# Patient Record
Sex: MALE | Race: WHITE | NOT HISPANIC OR LATINO | Employment: STUDENT | URBAN - METROPOLITAN AREA
[De-identification: names, ages, dates, MRNs, and addresses within clinical notes are randomized per-mention and may not be internally consistent; named-entity substitution may affect disease eponyms.]

---
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
## 2024-04-24 NOTE — TELEPHONE ENCOUNTER
IMM letter provided to patient.  Patient offered four hours to make informed decision regarding appeal process; patient agreeable to discharge.     Electronically signed by Araceli Campos RN on 4/24/2024 at 3:49 PM       ok

## 2024-11-06 ENCOUNTER — CLINICAL SUPPORT (OUTPATIENT)
Dept: FAMILY MEDICINE CLINIC | Facility: CLINIC | Age: 19
End: 2024-11-06

## 2024-11-06 DIAGNOSIS — Z02.1 PRE-EMPLOYMENT DRUG SCREENING: Primary | ICD-10-CM

## 2024-11-06 PROCEDURE — 80306 DRUG TEST PRSMV INSTRMNT: CPT | Performed by: FAMILY MEDICINE

## 2025-01-13 ENCOUNTER — TELEPHONE (OUTPATIENT)
Dept: FAMILY MEDICINE CLINIC | Facility: CLINIC | Age: 20
End: 2025-01-13
Payer: COMMERCIAL

## 2025-01-13 DIAGNOSIS — G43.911 INTRACTABLE MIGRAINE WITH STATUS MIGRAINOSUS, UNSPECIFIED MIGRAINE TYPE: ICD-10-CM

## 2025-01-13 RX ORDER — SUMATRIPTAN SUCCINATE 100 MG/1
50-100 TABLET ORAL ONCE AS NEEDED
Qty: 12 TABLET | Refills: 11 | Status: SHIPPED | OUTPATIENT
Start: 2025-01-13

## 2025-01-13 NOTE — TELEPHONE ENCOUNTER
Okay to refill that with 11 refills, okay to continue his current dose if still effective or he could increase to 100mg tabkets as has grown, thanjs

## 2025-01-13 NOTE — TELEPHONE ENCOUNTER
"Patient is at home today due to a migraine. Patient was last seen 8/11/2023, and is asking if his Imitrex can be refilled before his next appointment.     He currently has Imitrex 50mg with sig of \"Take 0.5 to 1 tablet every 1 hr as needed for migraine. Max 2 per day\"  "

## 2025-01-31 ENCOUNTER — OFFICE VISIT (OUTPATIENT)
Dept: FAMILY MEDICINE CLINIC | Facility: CLINIC | Age: 20
End: 2025-01-31
Payer: COMMERCIAL

## 2025-01-31 VITALS
DIASTOLIC BLOOD PRESSURE: 76 MMHG | TEMPERATURE: 97.7 F | SYSTOLIC BLOOD PRESSURE: 124 MMHG | OXYGEN SATURATION: 98 % | WEIGHT: 291 LBS | RESPIRATION RATE: 18 BRPM | HEIGHT: 75 IN | BODY MASS INDEX: 36.18 KG/M2 | HEART RATE: 113 BPM

## 2025-01-31 DIAGNOSIS — S09.93XA FACIAL INJURY, INITIAL ENCOUNTER: Primary | ICD-10-CM

## 2025-01-31 DIAGNOSIS — R04.0 BLEEDING NOSE: ICD-10-CM

## 2025-01-31 DIAGNOSIS — E66.09 CLASS 2 OBESITY DUE TO EXCESS CALORIES WITHOUT SERIOUS COMORBIDITY WITH BODY MASS INDEX (BMI) OF 36.0 TO 36.9 IN ADULT: ICD-10-CM

## 2025-01-31 DIAGNOSIS — E66.812 CLASS 2 OBESITY DUE TO EXCESS CALORIES WITHOUT SERIOUS COMORBIDITY WITH BODY MASS INDEX (BMI) OF 36.0 TO 36.9 IN ADULT: ICD-10-CM

## 2025-01-31 PROCEDURE — 99213 OFFICE O/P EST LOW 20 MIN: CPT | Performed by: FAMILY MEDICINE

## 2025-01-31 NOTE — PROGRESS NOTES
Subjective   Franco Suarez is a 19 y.o. male.     Chief Complaint   Patient presents with    Facial Injury     Hit in the nose while playing basketball on 1/30/2025, with nose bleeds       History of Present Illness  Influenza immunization was not given due to patient refusal    Facial Injury   The incident occurred 12 to 24 hours ago. Injury mechanism: PLAYING BASKETBALL, AND GOT HIT DIRECTLY IN THE NOSE. The quality of the pain is described as stabbing. The pain is moderate. The pain has been intermittent since the injury. Pertinent negatives include no blurred vision, headaches or tinnitus. Associated symptoms comments: NOSE BLEEDS, NOSE SWELLING. He has tried nothing for the symptoms.            I personally reviewed and updated the patient's allergies, medications, problem list, and past medical, surgical, social, and family history. I have reviewed and confirmed the accuracy of the History of Present Illness and Review of Symptoms as documented by the MA/LPN/RN. Aroldo Kumari MD    Family History   Problem Relation Age of Onset    Drug abuse Father     Thyroid disease Maternal Aunt     Cancer Maternal Grandmother         skin cancer    Hyperlipidemia Maternal Grandfather     Hypertension Maternal Grandfather     Heart disease Maternal Grandfather        Social History     Tobacco Use    Smoking status: Never     Passive exposure: Never    Smokeless tobacco: Never   Vaping Use    Vaping status: Never Used   Substance Use Topics    Alcohol use: No    Drug use: No       Past Surgical History:   Procedure Laterality Date    KNEE SURGERY Left     guided growth surgery    KNEE SURGERY Left 03/12/2021       Patient Active Problem List   Diagnosis    Genu valgum    Syrinx    Encounter for WCC (well child check) with abnormal findings    Accommodative insufficiency    Hypermetropia, bilateral    Presence of retained hardware    Tendinitis of right rotator cuff    Asthma    Acute seasonal allergic rhinitis     Hypermetropia    Myopia    Pruritus    Class 2 severe obesity due to excess calories with serious comorbidity and body mass index (BMI) of 36.0 to 36.9 in adult    Right foot pain    Patellar tendinitis of right knee    Acute pain of left knee         Current Outpatient Medications:     ibuprofen (ADVIL,MOTRIN) 800 MG tablet, Take 1 tablet by mouth Every 8 (Eight) Hours As Needed for Moderate Pain (knee pain)., Disp: 90 tablet, Rfl: 1    SUMAtriptan (IMITREX) 100 MG tablet, Take 0.5-1 tablets by mouth 1 (One) Time As Needed for Migraine (May repeat in two hours (Maximum two per day).)., Disp: 12 tablet, Rfl: 11    albuterol sulfate HFA (ProAir HFA) 108 (90 Base) MCG/ACT inhaler, Inhale 2 puffs Every 6 (Six) Hours As Needed for Wheezing or Shortness of Air. (Patient not taking: Reported on 1/31/2025), Disp: 8.5 g, Rfl: 2    Fluticasone Furoate (Arnuity Ellipta) 100 MCG/ACT aerosol powder , Inhale 1 puff Daily. (Patient not taking: Reported on 1/31/2025), Disp: 30 each, Rfl: 2    montelukast (SINGULAIR) 10 MG tablet, Take 1 tablet by mouth Every Night. (Patient not taking: Reported on 1/31/2025), Disp: 30 tablet, Rfl: 12    ondansetron ODT (ZOFRAN-ODT) 4 MG disintegrating tablet, Place 1 tablet on the tongue Every 8 (Eight) Hours As Needed for Nausea or Vomiting. (Patient not taking: Reported on 1/31/2025), Disp: 42 tablet, Rfl: 0         Review of Systems   Constitutional:  Negative for chills and diaphoresis.   HENT:  Negative for tinnitus, trouble swallowing and voice change.    Eyes:  Negative for blurred vision and visual disturbance.   Respiratory:  Negative for shortness of breath.    Cardiovascular:  Negative for chest pain and palpitations.   Gastrointestinal:  Negative for abdominal pain and nausea.   Endocrine: Negative for polydipsia and polyphagia.   Genitourinary:  Negative for hematuria.   Musculoskeletal:  Negative for neck stiffness.   Skin:  Negative for color change and pallor.  "  Allergic/Immunologic: Negative for immunocompromised state.   Neurological:  Negative for seizures and syncope.   Hematological:  Negative for adenopathy.   Psychiatric/Behavioral:  Negative for sleep disturbance and suicidal ideas.        I have reviewed and confirmed the accuracy of the ROS as documented by the MA/LPN/RN Aroldo Kumari MD      Objective   /76 (BP Location: Right arm, Patient Position: Sitting, Cuff Size: Adult)   Pulse 113   Temp 97.7 °F (36.5 °C) (Temporal)   Resp 18   Ht 189.9 cm (74.76\")   Wt 132 kg (291 lb)   SpO2 98%   BMI 36.60 kg/m²   BP Readings from Last 3 Encounters:   01/31/25 124/76   08/11/23 142/88   03/21/23 136/90     Wt Readings from Last 3 Encounters:   01/31/25 132 kg (291 lb)   08/11/23 126 kg (278 lb 3.2 oz) (>99%, Z= 2.81)*   03/21/23 127 kg (280 lb 6.4 oz) (>99%, Z= 2.83)*     * Growth percentiles are based on CDC (Boys, 2-20 Years) data.     Physical Exam  Constitutional:       Appearance: Normal appearance. He is well-developed. He is not diaphoretic.   HENT:      Head: Normocephalic and atraumatic.      Right Ear: Hearing, tympanic membrane, ear canal and external ear normal.      Left Ear: Hearing, tympanic membrane, ear canal and external ear normal.      Nose: Nose normal. No mucosal edema or congestion.      Right Sinus: No maxillary sinus tenderness or frontal sinus tenderness.      Left Sinus: No maxillary sinus tenderness or frontal sinus tenderness.      Comments: Bruising, mild asymmetry nasal bridge, no bleeding, no sign of septal hematoma.     Mouth/Throat:      Mouth: Mucous membranes are moist. No oral lesions.      Pharynx: Uvula midline. No oropharyngeal exudate or posterior oropharyngeal erythema.      Tonsils: No tonsillar exudate.   Eyes:      General: Lids are normal.      Extraocular Movements: Extraocular movements intact.      Conjunctiva/sclera: Conjunctivae normal.      Pupils: Pupils are equal, round, and reactive to light.   Neck: " "     Thyroid: No thyromegaly.      Vascular: No carotid bruit or JVD.      Trachea: No tracheal deviation.   Cardiovascular:      Rate and Rhythm: Normal rate and regular rhythm.      Heart sounds: Normal heart sounds. No murmur heard.     No friction rub. No gallop.   Pulmonary:      Effort: Pulmonary effort is normal.      Breath sounds: Normal breath sounds. No stridor. No decreased breath sounds, wheezing or rales.   Abdominal:      General: Bowel sounds are normal. There is no distension.      Palpations: Abdomen is soft. There is no mass.      Tenderness: There is no abdominal tenderness. There is no guarding or rebound.      Hernia: No hernia is present.   Lymphadenopathy:      Head:      Right side of head: No submental, submandibular, tonsillar, preauricular, posterior auricular or occipital adenopathy.      Left side of head: No submental, submandibular, tonsillar, preauricular, posterior auricular or occipital adenopathy.      Cervical: No cervical adenopathy.   Skin:     General: Skin is warm and dry.      Coloration: Skin is not pale.   Neurological:      Mental Status: He is alert and oriented to person, place, and time.      Cranial Nerves: No cranial nerve deficit.      Sensory: No sensory deficit.      Coordination: Coordination normal.      Gait: Gait normal.      Deep Tendon Reflexes: Reflexes are normal and symmetric.         Data / Lab Results:    No results found for: \"HGBA1C\"  Lab Results   Component Value Date    Glucose 95 01/14/2020    Glucose, UA 79 01/10/2019     No results found for: \"LDL\", \"LDLDIRECT\"  No results found for: \"CHOL\"  No results found for: \"TRIG\"  No results found for: \"HDL\"  No results found for: \"PSA\"  Lab Results   Component Value Date    WBC 12.6 (H) 03/21/2023    HGB 17.7 03/21/2023    HCT 50.9 03/21/2023    MCV 84 03/21/2023     03/21/2023     Lab Results   Component Value Date    TSH 3.71 01/10/2019      Lab Results   Component Value Date    GLUCOSE 95 " "01/14/2020    BUN 9 01/14/2020    CREATININE 0.76 01/14/2020    EGFRIFNONA CANCELED 01/14/2020    EGFRIFAFRI CANCELED 01/14/2020    BCR 12 01/14/2020    K 4.5 01/14/2020    CO2 23 01/14/2020    CALCIUM 9.4 01/14/2020    PROTENTOTREF 6.5 01/14/2020    ALBUMIN 4.2 01/14/2020    LABIL2 1.8 01/14/2020    AST 22 01/14/2020    ALT 25 01/14/2020     No results found for: \"WILTON\", \"RF\", \"SEDRATE\"   No results found for: \"CRP\"   No results found for: \"IRON\", \"TIBC\", \"FERRITIN\"   No results found for: \"QFIZCJBV27\"       Assessment & Plan      Medications        Problem List         LOS  Flu A.  Start Tamiflu/Keflex.  Increase fluid intake.  Signs symptoms dehydration discussed, call return if worsening symptoms.  Health maintanance.  Doing well, vaccines updated.  Anticipatory guidance discussed.  Cleared for sports.  Acute bacterial sinusitis.  Start antibiotics.  Increase fluid intake call return if new or worsening symptoms  Allergic rhinitis.  Continue antihistamine, nasal steroid.  Has done well on montelukast, restart.  Genu valgum.  Left knee.  Improved status post plate placement, has had a recent removal, recovering well today, incision clean and dry, benign exam.  Followed by orthopedics Dr. Galaviz.  Costochondritis.  Benign exam today.  Ice, NSAIDs, rehabilitation exercise discussed.  Call return if persistent symptoms.  Shoulder strain/rotator cuff tendinitis.    Improved/rrsolved today. Ice/nsaids/rehabilitation exercise discussed.  Consider joint injection, imaging if persistent symptoms.  Migraine headache.  Overall improved/infrequent episodes currently.  Flare currently IM Toradol given.  Increase Imitrex to 50 mg.  Call return if persistent symptoms.  Wart wrist.  Frozen today.  OTC Compound W.  Consider repeat freezing if persistent symptoms.  Foot pain.  Right distal metatarsal.  Traumatic, DDx includes tendinitis, no sign of fracture currently..  DDx includes gout.  Start as needed ibuprofen/prednisone.  " Discussed discussed active lifestyle modification.   Patella tendinitis.  Right knee by 3 to 4 months.  Start prednisone.  Ice, rehabilitation/discussed.  Check x-rays.  Call return with persistent symptoms.  Left knee pain.  Traumatic, fell, landed on knee.  With bursitis start prednisone.  Check x-rays.  History of genu valgus surgery, consider orthopedics follow-up if persistent symptoms.  Nose injury.  Possible fracture, with mild displacement.  Having some bleeding but hemistatic currentky  No sign of septal hematoma.  Discussed imaging, HEENT eval he is not worried about repair aestheticalky.  Rest/off work.  Follow-up recheck.  Consider imaging, HEENT referral if worsening symptoms.  Concussion.  Overall benign exam today, advancing activity.  History of prior head injury, no history of loss of consciousness.  Signs and symptoms of subdural hematoma discussed.  Call return if worsening symptoms.      Diagnoses and all orders for this visit:    1. Facial injury, initial encounter (Primary)    2. Bleeding nose    3. Class 2 obesity due to excess calories without serious comorbidity with body mass index (BMI) of 36.0 to 36.9 in adult      Class 2 Severe Obesity (BMI >=35 and <=39.9). Obesity-related health conditions include the following: none. Obesity is worsening. BMI is is above average; BMI management plan is completed. We discussed portion control and increasing exercise.          Expected course, medications, and adverse effects discussed.  Call or return if worsening or persistent symptoms.  I wore protective equipment throughout this patient encounter including a mask, gloves, and eye protection.  Hand hygiene was performed before donning protective equipment and after removal when leaving the room. The complete contents of the Assessment and Plan and Data/Lab Results as documented above have been reviewed and addressed by myself with the patient today as part of an ongoing evaluation / treatment plan.   If some of the documentation has been copied from a previous note and is unchanged it indicates that this problem / plan has been assessed today but is stable from a previous visit and no changes have been recommended.

## 2025-01-31 NOTE — LETTER
January 31, 2025     Patient: Franco Suarez   YOB: 2005   Date of Visit: 1/31/2025       To Whom It May Concern:    It is my medical opinion that Franco Suarez may return to work on 1/31/2025 .           Sincerely,        Aroldo Kumari MD    CC: No Recipients

## 2025-01-31 NOTE — LETTER
January 31, 2025     Patient: Franco Suarez   YOB: 2005   Date of Visit: 1/31/2025       To Whom It May Concern:    It is my medical opinion that Franco Suarez may return to work on 2/3/2025 .           Sincerely,            Aroldo Kumari MD

## 2025-05-16 ENCOUNTER — OFFICE VISIT (OUTPATIENT)
Dept: FAMILY MEDICINE CLINIC | Facility: CLINIC | Age: 20
End: 2025-05-16
Payer: OTHER MISCELLANEOUS

## 2025-05-16 VITALS
OXYGEN SATURATION: 100 % | HEIGHT: 75 IN | RESPIRATION RATE: 16 BRPM | HEART RATE: 86 BPM | SYSTOLIC BLOOD PRESSURE: 126 MMHG | BODY MASS INDEX: 35.96 KG/M2 | TEMPERATURE: 98 F | DIASTOLIC BLOOD PRESSURE: 72 MMHG | WEIGHT: 289.2 LBS

## 2025-05-16 DIAGNOSIS — S69.91XA HAND INJURY, RIGHT, INITIAL ENCOUNTER: Primary | ICD-10-CM

## 2025-05-16 DIAGNOSIS — E66.01 CLASS 2 SEVERE OBESITY DUE TO EXCESS CALORIES WITH SERIOUS COMORBIDITY AND BODY MASS INDEX (BMI) OF 36.0 TO 36.9 IN ADULT: ICD-10-CM

## 2025-05-16 DIAGNOSIS — E66.812 CLASS 2 SEVERE OBESITY DUE TO EXCESS CALORIES WITH SERIOUS COMORBIDITY AND BODY MASS INDEX (BMI) OF 36.0 TO 36.9 IN ADULT: ICD-10-CM

## 2025-05-16 PROCEDURE — 99213 OFFICE O/P EST LOW 20 MIN: CPT | Performed by: FAMILY MEDICINE

## 2025-05-16 RX ORDER — CEPHALEXIN 500 MG/1
500 CAPSULE ORAL 3 TIMES DAILY
Qty: 30 CAPSULE | Refills: 0 | Status: SHIPPED | OUTPATIENT
Start: 2025-05-16

## 2025-05-16 RX ORDER — TRIAMCINOLONE ACETONIDE 5 MG/G
1 CREAM TOPICAL 2 TIMES DAILY PRN
Qty: 60 G | Refills: 2 | Status: SHIPPED | OUTPATIENT
Start: 2025-05-16

## 2025-05-16 NOTE — PROGRESS NOTES
Subjective   Franco Suarez is a 20 y.o. male.     Chief Complaint   Patient presents with    Hand Injury     Right    Worker's Compensation       History of Present Illness  Worker Comp:  Date of Injury: 5/15/2025  Type of injury: Right Hand directly sprayed with propane that is cold  Affected Area: Right Hand. Complains of Right Index Finger having numbness and with pressure it stings.   How the incident occurred:patient reports he was changing the Propane tank on the [employer], and the propane tank started spraying out. The Propane sprayed on his right hand, mainly on right index finger.   He was not previously evaluated    Hand Injury   The incident occurred 12 to 24 hours ago. The incident occurred at work. Injury mechanism: Sprayed with Propane when changing the tanks. The pain is present in the right fingers and right hand. Quality: stining, numbness. The pain does not radiate. The pain is mild. Associated symptoms include numbness. Pertinent negatives include no chest pain. The symptoms are aggravated by palpation and movement. He has tried nothing for the symptoms.            I personally reviewed and updated the patient's allergies, medications, problem list, and past medical, surgical, social, and family history. I have reviewed and confirmed the accuracy of the History of Present Illness and Review of Symptoms as documented by the MA/DOLORESN/RN. Lauren Kline MA    Family History   Problem Relation Age of Onset    Drug abuse Father     Thyroid disease Maternal Aunt     Cancer Maternal Grandmother         skin cancer    Hyperlipidemia Maternal Grandfather     Hypertension Maternal Grandfather     Heart disease Maternal Grandfather        Social History     Tobacco Use    Smoking status: Never     Passive exposure: Never    Smokeless tobacco: Never   Vaping Use    Vaping status: Never Used   Substance Use Topics    Alcohol use: Yes    Drug use: No       Past Surgical History:   Procedure Laterality Date     KNEE SURGERY Left     guided growth surgery    KNEE SURGERY Left 03/12/2021       Patient Active Problem List   Diagnosis    Genu valgum    Syrinx    Encounter for WCC (well child check) with abnormal findings    Accommodative insufficiency    Hypermetropia, bilateral    Presence of retained hardware    Tendinitis of right rotator cuff    Asthma    Acute seasonal allergic rhinitis    Hypermetropia    Myopia    Pruritus    Class 2 severe obesity due to excess calories with serious comorbidity and body mass index (BMI) of 36.0 to 36.9 in adult    Right foot pain    Patellar tendinitis of right knee    Acute pain of left knee         Current Outpatient Medications:     ibuprofen (ADVIL,MOTRIN) 800 MG tablet, Take 1 tablet by mouth Every 8 (Eight) Hours As Needed for Moderate Pain (knee pain)., Disp: 90 tablet, Rfl: 1    SUMAtriptan (IMITREX) 100 MG tablet, Take 0.5-1 tablets by mouth 1 (One) Time As Needed for Migraine (May repeat in two hours (Maximum two per day).)., Disp: 12 tablet, Rfl: 11    albuterol sulfate HFA (ProAir HFA) 108 (90 Base) MCG/ACT inhaler, Inhale 2 puffs Every 6 (Six) Hours As Needed for Wheezing or Shortness of Air. (Patient not taking: Reported on 5/16/2025), Disp: 8.5 g, Rfl: 2    Fluticasone Furoate (Arnuity Ellipta) 100 MCG/ACT aerosol powder , Inhale 1 puff Daily. (Patient not taking: Reported on 5/16/2025), Disp: 30 each, Rfl: 2    montelukast (SINGULAIR) 10 MG tablet, Take 1 tablet by mouth Every Night. (Patient not taking: Reported on 5/16/2025), Disp: 30 tablet, Rfl: 12    ondansetron ODT (ZOFRAN-ODT) 4 MG disintegrating tablet, Place 1 tablet on the tongue Every 8 (Eight) Hours As Needed for Nausea or Vomiting. (Patient not taking: Reported on 5/16/2025), Disp: 42 tablet, Rfl: 0         Review of Systems   Constitutional:  Negative for chills and diaphoresis.   HENT:  Negative for trouble swallowing and voice change.    Eyes:  Negative for visual disturbance.   Respiratory:  Negative  "for shortness of breath.    Cardiovascular:  Negative for chest pain and palpitations.   Gastrointestinal:  Negative for abdominal pain and nausea.   Endocrine: Negative for polydipsia and polyphagia.   Genitourinary:  Negative for hematuria.   Musculoskeletal:  Negative for neck stiffness.   Skin:  Negative for color change and pallor.   Allergic/Immunologic: Negative for immunocompromised state.   Neurological:  Positive for numbness. Negative for seizures and syncope.   Hematological:  Negative for adenopathy.   Psychiatric/Behavioral:  Negative for sleep disturbance and suicidal ideas.        I have reviewed and confirmed the accuracy of the ROS as documented by the MA/LPN/RN Lauren Kline MA      Objective   /72 (BP Location: Left arm, Patient Position: Sitting, Cuff Size: Adult)   Pulse 86   Temp 98 °F (36.7 °C) (Temporal)   Resp 16   Ht 189.9 cm (74.76\")   Wt 131 kg (289 lb 3.2 oz)   SpO2 100%   BMI 36.38 kg/m²   BP Readings from Last 3 Encounters:   05/16/25 126/72   01/31/25 124/76   08/11/23 142/88     Wt Readings from Last 3 Encounters:   05/16/25 131 kg (289 lb 3.2 oz)   01/31/25 132 kg (291 lb)   08/11/23 126 kg (278 lb 3.2 oz) (>99%, Z= 2.81)*     * Growth percentiles are based on CDC (Boys, 2-20 Years) data.     Physical Exam  Constitutional:       Appearance: Normal appearance. He is well-developed. He is not diaphoretic.   HENT:      Head: Normocephalic and atraumatic.      Right Ear: Tympanic membrane, ear canal and external ear normal.      Left Ear: Tympanic membrane, ear canal and external ear normal.      Nose: Nose normal.      Mouth/Throat:      Mouth: Mucous membranes are moist.   Eyes:      General: Lids are normal.      Extraocular Movements: Extraocular movements intact.      Conjunctiva/sclera: Conjunctivae normal.      Pupils: Pupils are equal, round, and reactive to light.   Neck:      Thyroid: No thyromegaly.      Vascular: No carotid bruit or JVD.      Trachea: No " "tracheal deviation.   Cardiovascular:      Rate and Rhythm: Normal rate and regular rhythm.      Heart sounds: Normal heart sounds. No murmur heard.     No friction rub. No gallop.   Pulmonary:      Effort: Pulmonary effort is normal.      Breath sounds: Normal breath sounds. No stridor. No decreased breath sounds, wheezing or rales.   Abdominal:      General: Bowel sounds are normal. There is no distension.      Palpations: Abdomen is soft. There is no mass.      Tenderness: There is no abdominal tenderness. There is no guarding or rebound.      Hernia: No hernia is present.   Lymphadenopathy:      Head:      Right side of head: No submental, submandibular, tonsillar, preauricular, posterior auricular or occipital adenopathy.      Left side of head: No submental, submandibular, tonsillar, preauricular, posterior auricular or occipital adenopathy.      Cervical: No cervical adenopathy.   Skin:     General: Skin is warm and dry.      Coloration: Skin is not pale.      Comments: Redness, irritation radial aspect and consistent with cold injury, hand/digits neurovascularly intact   Neurological:      Mental Status: He is alert and oriented to person, place, and time.      Cranial Nerves: No cranial nerve deficit.      Sensory: No sensory deficit.      Coordination: Coordination normal.      Gait: Gait normal.      Deep Tendon Reflexes: Reflexes are normal and symmetric.       Data / Lab Results:    No results found for: \"HGBA1C\"  Lab Results   Component Value Date    Glucose 95 01/14/2020    Glucose, UA 79 01/10/2019     No results found for: \"LDL\", \"LDLDIRECT\"  No results found for: \"CHOL\"  No results found for: \"TRIG\"  No results found for: \"HDL\"  No results found for: \"PSA\"  Lab Results   Component Value Date    WBC 12.6 (H) 03/21/2023    HGB 17.7 03/21/2023    HCT 50.9 03/21/2023    MCV 84 03/21/2023     03/21/2023     Lab Results   Component Value Date    TSH 3.71 01/10/2019      Lab Results   Component " "Value Date    GLUCOSE 95 01/14/2020    BUN 9 01/14/2020    CREATININE 0.76 01/14/2020    EGFRIFNONA CANCELED 01/14/2020    EGFRIFAFRI CANCELED 01/14/2020    BCR 12 01/14/2020    K 4.5 01/14/2020    CO2 23 01/14/2020    CALCIUM 9.4 01/14/2020    ALBUMIN 4.2 01/14/2020    AST 22 01/14/2020    ALT 25 01/14/2020     No results found for: \"WILTON\", \"RF\", \"SEDRATE\"   No results found for: \"CRP\"   No results found for: \"IRON\", \"TIBC\", \"FERRITIN\"   No results found for: \"DSHGCJYD76\"       Assessment & Plan      Medications        Problem List         [facility] maintanance.  Doing well, vaccines updated.  Anticipatory guidance discussed.  Cleared for sports.  Allergic rhinitis.  Continue antihistamine, nasal steroid.  Has done well on montelukast, restart.  Genu valgum.  Left knee.  Improved status post plate placement, has had a recent removal, recovering well today, incision clean and dry, benign exam.  Followed by orthopedics Dr. Galaviz.  Costochondritis.  Benign exam today.  Ice, NSAIDs, rehabilitation exercise discussed.  Call return if persistent symptoms.  Shoulder strain/rotator cuff tendinitis.    Improved/rrsolved today. Ice/nsaids/rehabilitation exercise discussed.  Consider joint injection, imaging if persistent symptoms.  Migraine headache.  Overall improved/infrequent episodes currently.  Flare currently IM Toradol given.  Increase Imitrex to 50 mg.  Call return if persistent symptoms.  Wart wrist.  Frozen today.  OTC Compound W.  Consider repeat freezing if persistent symptoms.  Foot pain.  Right distal metatarsal.  Traumatic, DDx includes tendinitis, no sign of fracture currently..  DDx includes gout.  Start as needed ibuprofen/prednisone.  Discussed discussed active lifestyle modification.   Patella tendinitis.  Right knee by 3 to 4 months.  Start prednisone.  Ice, rehabilitation/discussed.  Check x-rays.  Call return with persistent symptoms.  Left knee pain.  Traumatic, fell, landed on knee.  With bursitis " start prednisone.  Check x-rays.  History of genu valgus surgery, consider orthopedics follow-up if persistent symptoms.  Nose injury.  Possible fracture, with mild displacement.  Having some bleeding but hemistatic currentky  No sign of septal hematoma.  Discussed imaging, HEENT eval he is not worried about repair aestheticalky.  Rest/off work.  Follow-up recheck.  Consider imaging, HEENT referral if worsening symptoms.  Concussion.  Overall benign exam today, advancing activity.  History of prior head injury, no history of loss of consciousness.  Signs and symptoms of subdural hematoma discussed.  Call return if worsening symptoms.  Propane exposure/nonfreezing cold injury.  Thenar area hand/first digit.  Overall benign exam today, range of motion, fist finger neurovascularly intact.  Start antibiotics/topical steroids.  Expected course discussed.  Signs and symptoms of infection discussed.  Call return if worsening symptoms.      Diagnoses and all orders for this visit:    1. Hand injury, right, initial encounter (Primary)    2. Class 2 severe obesity due to excess calories with serious comorbidity and body mass index (BMI) of 36.0 to 36.9 in adult              Expected course, medications, and adverse effects discussed.  Call or return if worsening or persistent symptoms.  I wore protective equipment throughout this patient encounter including a mask, gloves, and eye protection.  Hand hygiene was performed before donning protective equipment and after removal when leaving the room. The complete contents of the Assessment and Plan and Data/Lab Results as documented above have been reviewed and addressed by myself with the patient today as part of an ongoing evaluation / treatment plan.  If some of the documentation has been copied from a previous note and is unchanged it indicates that this problem / plan has been assessed today but is stable from a previous visit and no changes have been recommended.

## 2025-05-30 NOTE — PROGRESS NOTES
Medical Examination    Subjective   Franco Suarez is a 20 y.o. male who presents today for a  fitness determination physical exam. The patient reports no problems.  The following portions of the patient's history were reviewed and updated as appropriate: allergies, current medications, past family history, past medical history, past social history, past surgical history, and problem list.  Review of Systems  Pertinent items are noted in HPI.    Objective    Vision:  Vision Screening    Right eye Left eye Both eyes   Without correction 20/30 20/30 20/25   With correction          Applicant can recognize and distinguish among traffic control signals and devices showing standard red, green, and chacho colors.  Applicant has peripheral vision to 90 degrees in each eye.         Monocular Vision?: No      Hearing:  Applicant can distinguish forced whisper at a distance of 5 feet with both ears.         Physical Exam  Constitutional:       Appearance: He is well-developed.   HENT:      Head: Normocephalic and atraumatic.      Right Ear: External ear normal.      Left Ear: External ear normal.      Nose: Nose normal.   Eyes:      Pupils: Pupils are equal, round, and reactive to light.   Cardiovascular:      Rate and Rhythm: Normal rate and regular rhythm.      Heart sounds: Normal heart sounds.   Pulmonary:      Effort: Pulmonary effort is normal.      Breath sounds: Normal breath sounds.   Abdominal:      General: Bowel sounds are normal.      Palpations: Abdomen is soft.   Musculoskeletal:         General: Normal range of motion.      Cervical back: Normal range of motion and neck supple.   Skin:     General: Skin is warm and dry.   Neurological:      Mental Status: He is alert and oriented to person, place, and time.   Psychiatric:         Behavior: Behavior normal.         Thought Content: Thought content normal.         Judgment: Judgment normal.          Labs:  Lab Results    Component Value Date    SPECGRAV 1.005 06/02/2025    BILIRUBINUR Negative 06/02/2025    GLUCOSEU 79 01/10/2019       Assessment & Plan   Healthy male exam.   Meets standards in 49 .41;  qualifies for 2 year certificate.     Medical examiners certificate completed and printed.  Return as needed.

## 2025-06-02 ENCOUNTER — CLINICAL SUPPORT (OUTPATIENT)
Dept: FAMILY MEDICINE CLINIC | Facility: CLINIC | Age: 20
End: 2025-06-02

## 2025-06-02 VITALS
HEIGHT: 74 IN | OXYGEN SATURATION: 100 % | WEIGHT: 293 LBS | DIASTOLIC BLOOD PRESSURE: 84 MMHG | BODY MASS INDEX: 37.6 KG/M2 | HEART RATE: 76 BPM | RESPIRATION RATE: 20 BRPM | SYSTOLIC BLOOD PRESSURE: 138 MMHG

## 2025-06-02 DIAGNOSIS — Z02.4 ENCOUNTER FOR CDL (COMMERCIAL DRIVING LICENSE) EXAM: Primary | ICD-10-CM

## 2025-06-02 LAB
BILIRUB BLD-MCNC: NEGATIVE MG/DL
CLARITY, POC: CLEAR
COLOR UR: YELLOW
GLUCOSE UR STRIP-MCNC: NEGATIVE MG/DL
KETONES UR QL: NEGATIVE
LEUKOCYTE EST, POC: NEGATIVE
NITRITE UR-MCNC: NEGATIVE MG/ML
PH UR: 5 [PH] (ref 5–8)
PROT UR STRIP-MCNC: NEGATIVE MG/DL
RBC # UR STRIP: NEGATIVE /UL
SP GR UR: 1 (ref 1–1.03)
UROBILINOGEN UR QL: NORMAL

## 2025-06-02 PROCEDURE — 81002 URINALYSIS NONAUTO W/O SCOPE: CPT | Performed by: FAMILY MEDICINE

## 2025-06-02 PROCEDURE — DOTPHY: Performed by: FAMILY MEDICINE
